# Patient Record
Sex: MALE | Race: BLACK OR AFRICAN AMERICAN | NOT HISPANIC OR LATINO | URBAN - METROPOLITAN AREA
[De-identification: names, ages, dates, MRNs, and addresses within clinical notes are randomized per-mention and may not be internally consistent; named-entity substitution may affect disease eponyms.]

---

## 2021-07-01 ENCOUNTER — OUTPATIENT (OUTPATIENT)
Dept: OUTPATIENT SERVICES | Facility: HOSPITAL | Age: 66
LOS: 1 days | End: 2021-07-01
Payer: MEDICAID

## 2021-07-12 VITALS
SYSTOLIC BLOOD PRESSURE: 158 MMHG | HEART RATE: 94 BPM | HEIGHT: 67 IN | DIASTOLIC BLOOD PRESSURE: 95 MMHG | WEIGHT: 126.99 LBS

## 2021-07-12 NOTE — H&P ADULT - NSICDXPASTMEDICALHX_GEN_ALL_CORE_FT
PAST MEDICAL HISTORY:  HLD (hyperlipidemia)     HTN (hypertension)     PVD (peripheral vascular disease)     Systolic CHF

## 2021-07-12 NOTE — H&P ADULT - NSHPLABSRESULTS_GEN_ALL_CORE
12.7   7.62  )-----------( 352      ( 14 Jul 2021 15:00 )             41.1       07-14    138  |  100  |  11  ----------------------------<  90  3.7   |  31  |  0.85    Ca    9.5      14 Jul 2021 15:00    TPro  7.1  /  Alb  4.3  /  TBili  0.3  /  DBili  0.2  /  AST  27  /  ALT  19  /  AlkPhos  90  07-14      PT/INR - ( 14 Jul 2021 15:00 )   PT: 12.6 sec;   INR: 1.05          PTT - ( 14 Jul 2021 15:00 )  PTT:35.2 sec              EKG: NSR; TWI in II/III/aVF.

## 2021-07-12 NOTE — H&P ADULT - HISTORY OF PRESENT ILLNESS
Cardiologist: Dr. Marisela Drake   COVID:   Escort:   Pharmacy: [facility] [address] [facility] [phone]    of note: Pharm on chart has only 3 prescription (lipitor 10, Coreg 6.25 BID and enalapril 10 ; confirm meds on arrival.     66 y/o M current smoker with PMH of  HTN, HLD,  sCHF ( EF 25 %), CVA, severe PVD b/l (plan for PAD revascularization after cardiac cath) who presents to his cardiologist Dr. Drake for routine follow up. Pt. c/o chest discomfort, FRIEDMAN and leg claudication; Pt. also with recent admission at [facility] for acute sCHF exacerbation. Pt. has non-radiating subternal area describes it as pressure like which has been gradually worsening occurring intermittently for the last few weeks which occurs on moderate physical exertion and relived with rest. Pt. also has FRIEDMAN on light physical exertion. Also has b/l severe calf/thigh pain associated with LE edema, pain/heaviness occurring on exertion which is relieved with rest. Pt. denies any palpitations, N/V, syncope, fever, chills.   NST 6/12/21 revealed small amount of ischemia in apical region, EF 16 %.  ECHO 6/12/21 revealed EF 25 %, mild LVH, global LV hypo kinesis, grade 2 LV diastolic dysfunction, moderate MR/TR, mild pulmonary HTN. LE US revealed Right: severe stenosis of SFA, moderate stenosis of PTA and ELICEO occluded; Left: SFA occluded, severe stenosis of popliteal, PTA, ELICEO given the waveform concern for iliac stenosis. In light of patient’s risk factors, CCS class 3 sx and abnormal NST, pt. is now referred for cardiac cath with possible intervention.   Cardiologist: Dr. Marisela Drake   COVID negative 7/12/21 (in ppw from [facility])  Escort:   Pharmacy: [facility] [address] [facility] [phone]    of note: Pharm on chart has only 3 prescription (lipitor 10, Coreg 6.25 BID and enalapril 10 ; confirm meds on arrival.     64 y/o M current smoker with PMH of  HTN, HLD,  sCHF ( EF 25 %), CVA, severe PVD b/l (plan for PAD revascularization after cardiac cath) who presents to his cardiologist Dr. Drake for routine follow up. Pt. c/o chest discomfort, FRIEDMAN and leg claudication; Pt. also with recent admission at [facility] for acute sCHF exacerbation. Pt. has non-radiating subternal area describes it as pressure like which has been gradually worsening occurring intermittently for the last few weeks which occurs on moderate physical exertion and relived with rest. Pt. also has FRIEDMAN on light physical exertion. Also has b/l severe calf/thigh pain associated with LE edema, pain/heaviness occurring on exertion which is relieved with rest. Pt. denies any palpitations, N/V, syncope, fever, chills.   NST 6/12/21 revealed small amount of ischemia in apical region, EF 16 %.  ECHO 6/12/21 revealed EF 25 %, mild LVH, global LV hypo kinesis, grade 2 LV diastolic dysfunction, moderate MR/TR, mild pulmonary HTN. LE US revealed Right: severe stenosis of SFA, moderate stenosis of PTA and ELICEO occluded; Left: SFA occluded, severe stenosis of popliteal, PTA, ELICEO given the waveform concern for iliac stenosis. In light of patient’s risk factors, CCS class 3 sx and abnormal NST, pt. is now referred for cardiac cath with possible intervention.   Cardiologist: Dr. Marisela Drake   COVID negative 7/12/21 (in ppw from [facility])  Escort:   Pharmacy: [facility] [address] [facility] [phone]    66 y/o M current smoker with PMH of  HTN, HLD,  sCHF ( EF 25 %), CVA, severe PVD b/l (plan for PAD revascularization after cardiac cath) who presents to his cardiologist Dr. Drake for routine follow up. Pt. c/o chest discomfort, FRIEDMAN and leg claudication; Pt. also with recent admission at [facility] for acute sCHF exacerbation. Pt. has non-radiating subternal area describes it as pressure like which has been gradually worsening occurring intermittently for the last few weeks which occurs on moderate physical exertion and relived with rest. Pt. also has FRIEDMAN on light physical exertion. Also has b/l severe calf/thigh pain associated with LE edema, pain/heaviness occurring on exertion which is relieved with rest. Pt. denies any palpitations, N/V, syncope, fever, chills.   NST 6/12/21 revealed small amount of ischemia in apical region, EF 16 %.  ECHO 6/12/21 revealed EF 25 %, mild LVH, global LV hypo kinesis, grade 2 LV diastolic dysfunction, moderate MR/TR, mild pulmonary HTN. LE US revealed Right: severe stenosis of SFA, moderate stenosis of PTA and ELICEO occluded; Left: SFA occluded, severe stenosis of popliteal, PTA, ELICEO given the waveform concern for iliac stenosis. In light of patient’s risk factors, CCS class 3 sx and abnormal NST, pt. is now referred for cardiac cath with possible intervention.

## 2021-07-12 NOTE — H&P ADULT - ASSESSMENT
64 y/o M current smoker with PMH of  HTN, HLD,  sCHF ( EF 25 %), CVA, severe PVD b/l (plan for PAD revascularization after cardiac cath) who presents to his cardiologist Dr. Drake for routine follow up. Pt. c/o chest discomfort, FRIEDMAN and leg claudication; Pt. also with recent admission at [facility] for acute sCHF exacerbation. Pt. has non-radiating subternal area describes it as pressure like which has been gradually worsening occurring intermittently for the last few weeks which occurs on moderate physical exertion and relived with rest. Pt. also has FRIEDMAN on light physical exertion. Also has b/l severe calf/thigh pain associated with LE edema, pain/heaviness occurring on exertion which is relieved with rest. Pt. denies any palpitations, N/V, syncope, fever, chills.   NST 6/12/21 revealed small amount of ischemia in apical region, EF 16 %.  ECHO 6/12/21 revealed EF 25 %, mild LVH, global LV hypo kinesis, grade 2 LV diastolic dysfunction, moderate MR/TR, mild pulmonary HTN. LE US revealed Right: severe stenosis of SFA, moderate stenosis of PTA and ELICEO occluded; Left: SFA occluded, severe stenosis of popliteal, PTA, ELICEO given the waveform concern for iliac stenosis. In light of patient’s risk factors, CCS class 3 sx and abnormal NST, pt. is now referred for cardiac cath with possible intervention.    --ASA II; Mallampati III.   --VSS.   --Pt is a candidate for moderate sedation.   --LOAD: pt reports missed doses of ASA 81mg PO QD; Pt loaded w/ ASA 325mg PO x1 and Plavix 600mg PO x1.   --FLUIDS: EF 25%; euvolemic on exam; NS 30cc/hr for KVO pre cath.   --Potassium 3.7 pre-procedure; ordered KCl 40mEq PO x1.     Risks & benefits of procedure and alternative therapy have been explained to the patient including but not limited to: allergic reaction, bleeding w/possible need for blood transfusion, infection, renal and vascular compromise, limb damage, arrhythmia, stroke, vessel dissection/perforation, Myocardial infarction, emergent CABG. Informed consent obtained and in chart.

## 2021-07-14 ENCOUNTER — INPATIENT (INPATIENT)
Facility: HOSPITAL | Age: 66
LOS: 0 days | Discharge: HOME | DRG: 247 | End: 2021-07-15
Attending: HOSPITALIST | Admitting: HOSPITALIST
Payer: MEDICAID

## 2021-07-14 LAB
A1C WITH ESTIMATED AVERAGE GLUCOSE RESULT: 5.8 % — HIGH (ref 4–5.6)
ALBUMIN SERPL ELPH-MCNC: 4.3 G/DL — SIGNIFICANT CHANGE UP (ref 3.3–5)
ALP SERPL-CCNC: 90 U/L — SIGNIFICANT CHANGE UP (ref 40–120)
ALT FLD-CCNC: 19 U/L — SIGNIFICANT CHANGE UP (ref 10–45)
ANION GAP SERPL CALC-SCNC: 7 MMOL/L — SIGNIFICANT CHANGE UP (ref 5–17)
APTT BLD: 35.2 SEC — SIGNIFICANT CHANGE UP (ref 27.5–35.5)
AST SERPL-CCNC: 27 U/L — SIGNIFICANT CHANGE UP (ref 10–40)
BASOPHILS # BLD AUTO: 0.03 K/UL — SIGNIFICANT CHANGE UP (ref 0–0.2)
BASOPHILS NFR BLD AUTO: 0.4 % — SIGNIFICANT CHANGE UP (ref 0–2)
BILIRUB DIRECT SERPL-MCNC: 0.2 MG/DL — SIGNIFICANT CHANGE UP (ref 0–0.2)
BILIRUB INDIRECT FLD-MCNC: 0.1 MG/DL — LOW (ref 0.2–1)
BILIRUB SERPL-MCNC: 0.3 MG/DL — SIGNIFICANT CHANGE UP (ref 0.2–1.2)
BUN SERPL-MCNC: 11 MG/DL — SIGNIFICANT CHANGE UP (ref 7–23)
CALCIUM SERPL-MCNC: 9.5 MG/DL — SIGNIFICANT CHANGE UP (ref 8.4–10.5)
CHLORIDE SERPL-SCNC: 100 MMOL/L — SIGNIFICANT CHANGE UP (ref 96–108)
CHOLEST SERPL-MCNC: 110 MG/DL — SIGNIFICANT CHANGE UP
CO2 SERPL-SCNC: 31 MMOL/L — SIGNIFICANT CHANGE UP (ref 22–31)
CREAT SERPL-MCNC: 0.85 MG/DL — SIGNIFICANT CHANGE UP (ref 0.5–1.3)
EOSINOPHIL # BLD AUTO: 0.1 K/UL — SIGNIFICANT CHANGE UP (ref 0–0.5)
EOSINOPHIL NFR BLD AUTO: 1.3 % — SIGNIFICANT CHANGE UP (ref 0–6)
ESTIMATED AVERAGE GLUCOSE: 120 MG/DL — HIGH (ref 68–114)
GLUCOSE SERPL-MCNC: 90 MG/DL — SIGNIFICANT CHANGE UP (ref 70–99)
HCT VFR BLD CALC: 41.1 % — SIGNIFICANT CHANGE UP (ref 39–50)
HDLC SERPL-MCNC: 56 MG/DL — SIGNIFICANT CHANGE UP
HGB BLD-MCNC: 12.7 G/DL — LOW (ref 13–17)
IMM GRANULOCYTES NFR BLD AUTO: 0.3 % — SIGNIFICANT CHANGE UP (ref 0–1.5)
INR BLD: 1.05 — SIGNIFICANT CHANGE UP (ref 0.88–1.16)
LIPID PNL WITH DIRECT LDL SERPL: 42 MG/DL — SIGNIFICANT CHANGE UP
LYMPHOCYTES # BLD AUTO: 3.37 K/UL — HIGH (ref 1–3.3)
LYMPHOCYTES # BLD AUTO: 44.2 % — HIGH (ref 13–44)
MCHC RBC-ENTMCNC: 23.4 PG — LOW (ref 27–34)
MCHC RBC-ENTMCNC: 30.9 GM/DL — LOW (ref 32–36)
MCV RBC AUTO: 75.8 FL — LOW (ref 80–100)
MONOCYTES # BLD AUTO: 0.5 K/UL — SIGNIFICANT CHANGE UP (ref 0–0.9)
MONOCYTES NFR BLD AUTO: 6.6 % — SIGNIFICANT CHANGE UP (ref 2–14)
NEUTROPHILS # BLD AUTO: 3.6 K/UL — SIGNIFICANT CHANGE UP (ref 1.8–7.4)
NEUTROPHILS NFR BLD AUTO: 47.2 % — SIGNIFICANT CHANGE UP (ref 43–77)
NON HDL CHOLESTEROL: 54 MG/DL — SIGNIFICANT CHANGE UP
NRBC # BLD: 0 /100 WBCS — SIGNIFICANT CHANGE UP (ref 0–0)
PLATELET # BLD AUTO: 352 K/UL — SIGNIFICANT CHANGE UP (ref 150–400)
POTASSIUM SERPL-MCNC: 3.7 MMOL/L — SIGNIFICANT CHANGE UP (ref 3.5–5.3)
POTASSIUM SERPL-SCNC: 3.7 MMOL/L — SIGNIFICANT CHANGE UP (ref 3.5–5.3)
PROT SERPL-MCNC: 7.1 G/DL — SIGNIFICANT CHANGE UP (ref 6–8.3)
PROTHROM AB SERPL-ACNC: 12.6 SEC — SIGNIFICANT CHANGE UP (ref 10.6–13.6)
RBC # BLD: 5.42 M/UL — SIGNIFICANT CHANGE UP (ref 4.2–5.8)
RBC # FLD: 15.2 % — HIGH (ref 10.3–14.5)
SODIUM SERPL-SCNC: 138 MMOL/L — SIGNIFICANT CHANGE UP (ref 135–145)
TRIGL SERPL-MCNC: 60 MG/DL — SIGNIFICANT CHANGE UP
WBC # BLD: 7.62 K/UL — SIGNIFICANT CHANGE UP (ref 3.8–10.5)
WBC # FLD AUTO: 7.62 K/UL — SIGNIFICANT CHANGE UP (ref 3.8–10.5)

## 2021-07-14 PROCEDURE — 93010 ELECTROCARDIOGRAM REPORT: CPT

## 2021-07-14 PROCEDURE — 99223 1ST HOSP IP/OBS HIGH 75: CPT

## 2021-07-14 RX ORDER — FUROSEMIDE 40 MG
1 TABLET ORAL
Qty: 0 | Refills: 0 | DISCHARGE

## 2021-07-14 RX ORDER — CHLORHEXIDINE GLUCONATE 213 G/1000ML
1 SOLUTION TOPICAL ONCE
Refills: 0 | Status: DISCONTINUED | OUTPATIENT
Start: 2021-07-14 | End: 2021-07-14

## 2021-07-14 RX ORDER — CLOPIDOGREL BISULFATE 75 MG/1
600 TABLET, FILM COATED ORAL ONCE
Refills: 0 | Status: COMPLETED | OUTPATIENT
Start: 2021-07-14 | End: 2021-07-14

## 2021-07-14 RX ORDER — CARVEDILOL PHOSPHATE 80 MG/1
1 CAPSULE, EXTENDED RELEASE ORAL
Qty: 0 | Refills: 0 | DISCHARGE

## 2021-07-14 RX ORDER — CARVEDILOL PHOSPHATE 80 MG/1
6.25 CAPSULE, EXTENDED RELEASE ORAL EVERY 12 HOURS
Refills: 0 | Status: DISCONTINUED | OUTPATIENT
Start: 2021-07-14 | End: 2021-07-15

## 2021-07-14 RX ORDER — ASPIRIN/CALCIUM CARB/MAGNESIUM 324 MG
325 TABLET ORAL ONCE
Refills: 0 | Status: COMPLETED | OUTPATIENT
Start: 2021-07-14 | End: 2021-07-14

## 2021-07-14 RX ORDER — IPRATROPIUM/ALBUTEROL SULFATE 18-103MCG
1 AEROSOL WITH ADAPTER (GRAM) INHALATION
Qty: 0 | Refills: 0 | DISCHARGE

## 2021-07-14 RX ORDER — POTASSIUM CHLORIDE 20 MEQ
40 PACKET (EA) ORAL ONCE
Refills: 0 | Status: DISCONTINUED | OUTPATIENT
Start: 2021-07-14 | End: 2021-07-14

## 2021-07-14 RX ORDER — SODIUM CHLORIDE 9 MG/ML
500 INJECTION INTRAMUSCULAR; INTRAVENOUS; SUBCUTANEOUS
Refills: 0 | Status: DISCONTINUED | OUTPATIENT
Start: 2021-07-14 | End: 2021-07-14

## 2021-07-14 RX ORDER — ALBUTEROL 90 UG/1
2 AEROSOL, METERED ORAL EVERY 6 HOURS
Refills: 0 | Status: DISCONTINUED | OUTPATIENT
Start: 2021-07-14 | End: 2021-07-15

## 2021-07-14 RX ORDER — ASPIRIN/CALCIUM CARB/MAGNESIUM 324 MG
81 TABLET ORAL DAILY
Refills: 0 | Status: DISCONTINUED | OUTPATIENT
Start: 2021-07-15 | End: 2021-07-15

## 2021-07-14 RX ORDER — ATORVASTATIN CALCIUM 80 MG/1
1 TABLET, FILM COATED ORAL
Qty: 0 | Refills: 0 | DISCHARGE

## 2021-07-14 RX ORDER — POTASSIUM CHLORIDE 20 MEQ
40 PACKET (EA) ORAL ONCE
Refills: 0 | Status: COMPLETED | OUTPATIENT
Start: 2021-07-14 | End: 2021-07-14

## 2021-07-14 RX ORDER — CLOPIDOGREL BISULFATE 75 MG/1
75 TABLET, FILM COATED ORAL DAILY
Refills: 0 | Status: DISCONTINUED | OUTPATIENT
Start: 2021-07-15 | End: 2021-07-15

## 2021-07-14 RX ORDER — ATORVASTATIN CALCIUM 80 MG/1
40 TABLET, FILM COATED ORAL AT BEDTIME
Refills: 0 | Status: DISCONTINUED | OUTPATIENT
Start: 2021-07-14 | End: 2021-07-15

## 2021-07-14 RX ORDER — FUROSEMIDE 40 MG
40 TABLET ORAL DAILY
Refills: 0 | Status: DISCONTINUED | OUTPATIENT
Start: 2021-07-15 | End: 2021-07-15

## 2021-07-14 RX ADMIN — ATORVASTATIN CALCIUM 40 MILLIGRAM(S): 80 TABLET, FILM COATED ORAL at 23:55

## 2021-07-14 RX ADMIN — CARVEDILOL PHOSPHATE 6.25 MILLIGRAM(S): 80 CAPSULE, EXTENDED RELEASE ORAL at 23:55

## 2021-07-14 RX ADMIN — SODIUM CHLORIDE 30 MILLILITER(S): 9 INJECTION INTRAMUSCULAR; INTRAVENOUS; SUBCUTANEOUS at 15:43

## 2021-07-14 RX ADMIN — Medication 40 MILLIEQUIVALENT(S): at 22:12

## 2021-07-14 RX ADMIN — Medication 325 MILLIGRAM(S): at 15:44

## 2021-07-14 RX ADMIN — CLOPIDOGREL BISULFATE 600 MILLIGRAM(S): 75 TABLET, FILM COATED ORAL at 15:45

## 2021-07-14 NOTE — PATIENT PROFILE ADULT - TOBACCO CESSATION EDUCATION/COUNSELLING(PROVIDED IF TOBACCO USED IN THE PAST 30 DAYS) NON CORE MEASURE SITES
[Time Spent: ___ minutes] : I have spent [unfilled] minutes of time on the encounter.
Offered and patient declined

## 2021-07-15 VITALS — TEMPERATURE: 98 F

## 2021-07-15 LAB
ANION GAP SERPL CALC-SCNC: 9 MMOL/L — SIGNIFICANT CHANGE UP (ref 5–17)
BUN SERPL-MCNC: 12 MG/DL — SIGNIFICANT CHANGE UP (ref 7–23)
CALCIUM SERPL-MCNC: 9 MG/DL — SIGNIFICANT CHANGE UP (ref 8.4–10.5)
CHLORIDE SERPL-SCNC: 102 MMOL/L — SIGNIFICANT CHANGE UP (ref 96–108)
CO2 SERPL-SCNC: 27 MMOL/L — SIGNIFICANT CHANGE UP (ref 22–31)
COVID-19 SPIKE DOMAIN AB INTERP: POSITIVE
COVID-19 SPIKE DOMAIN ANTIBODY RESULT: 129 U/ML — HIGH
CREAT SERPL-MCNC: 0.84 MG/DL — SIGNIFICANT CHANGE UP (ref 0.5–1.3)
GLUCOSE SERPL-MCNC: 81 MG/DL — SIGNIFICANT CHANGE UP (ref 70–99)
HCT VFR BLD CALC: 38.9 % — LOW (ref 39–50)
HCV AB S/CO SERPL IA: 0.03 S/CO — SIGNIFICANT CHANGE UP
HCV AB SERPL-IMP: SIGNIFICANT CHANGE UP
HGB BLD-MCNC: 12.1 G/DL — LOW (ref 13–17)
MAGNESIUM SERPL-MCNC: 1.6 MG/DL — SIGNIFICANT CHANGE UP (ref 1.6–2.6)
MCHC RBC-ENTMCNC: 22.8 PG — LOW (ref 27–34)
MCHC RBC-ENTMCNC: 31.1 GM/DL — LOW (ref 32–36)
MCV RBC AUTO: 73.4 FL — LOW (ref 80–100)
NRBC # BLD: 0 /100 WBCS — SIGNIFICANT CHANGE UP (ref 0–0)
PLATELET # BLD AUTO: 321 K/UL — SIGNIFICANT CHANGE UP (ref 150–400)
POTASSIUM SERPL-MCNC: 4 MMOL/L — SIGNIFICANT CHANGE UP (ref 3.5–5.3)
POTASSIUM SERPL-SCNC: 4 MMOL/L — SIGNIFICANT CHANGE UP (ref 3.5–5.3)
RBC # BLD: 5.3 M/UL — SIGNIFICANT CHANGE UP (ref 4.2–5.8)
RBC # FLD: 15.1 % — HIGH (ref 10.3–14.5)
SARS-COV-2 IGG+IGM SERPL QL IA: 129 U/ML — HIGH
SARS-COV-2 IGG+IGM SERPL QL IA: POSITIVE
SODIUM SERPL-SCNC: 138 MMOL/L — SIGNIFICANT CHANGE UP (ref 135–145)
WBC # BLD: 8.09 K/UL — SIGNIFICANT CHANGE UP (ref 3.8–10.5)
WBC # FLD AUTO: 8.09 K/UL — SIGNIFICANT CHANGE UP (ref 3.8–10.5)

## 2021-07-15 PROCEDURE — 36415 COLL VENOUS BLD VENIPUNCTURE: CPT

## 2021-07-15 PROCEDURE — C1894: CPT

## 2021-07-15 PROCEDURE — C1725: CPT

## 2021-07-15 PROCEDURE — 80053 COMPREHEN METABOLIC PANEL: CPT

## 2021-07-15 PROCEDURE — 86769 SARS-COV-2 COVID-19 ANTIBODY: CPT

## 2021-07-15 PROCEDURE — 85730 THROMBOPLASTIN TIME PARTIAL: CPT

## 2021-07-15 PROCEDURE — C1887: CPT

## 2021-07-15 PROCEDURE — 80061 LIPID PANEL: CPT

## 2021-07-15 PROCEDURE — 99239 HOSP IP/OBS DSCHRG MGMT >30: CPT

## 2021-07-15 PROCEDURE — 83735 ASSAY OF MAGNESIUM: CPT

## 2021-07-15 PROCEDURE — 82248 BILIRUBIN DIRECT: CPT

## 2021-07-15 PROCEDURE — 85610 PROTHROMBIN TIME: CPT

## 2021-07-15 PROCEDURE — 83036 HEMOGLOBIN GLYCOSYLATED A1C: CPT

## 2021-07-15 PROCEDURE — 85027 COMPLETE CBC AUTOMATED: CPT

## 2021-07-15 PROCEDURE — 85025 COMPLETE CBC W/AUTO DIFF WBC: CPT

## 2021-07-15 PROCEDURE — 93005 ELECTROCARDIOGRAM TRACING: CPT

## 2021-07-15 PROCEDURE — 86803 HEPATITIS C AB TEST: CPT

## 2021-07-15 PROCEDURE — C1753: CPT

## 2021-07-15 PROCEDURE — 80048 BASIC METABOLIC PNL TOTAL CA: CPT

## 2021-07-15 PROCEDURE — C1874: CPT

## 2021-07-15 PROCEDURE — 93010 ELECTROCARDIOGRAM REPORT: CPT

## 2021-07-15 PROCEDURE — C1769: CPT

## 2021-07-15 RX ORDER — ASPIRIN/CALCIUM CARB/MAGNESIUM 324 MG
1 TABLET ORAL
Qty: 30 | Refills: 11
Start: 2021-07-15 | End: 2022-07-09

## 2021-07-15 RX ORDER — MAGNESIUM SULFATE 500 MG/ML
2 VIAL (ML) INJECTION ONCE
Refills: 0 | Status: COMPLETED | OUTPATIENT
Start: 2021-07-15 | End: 2021-07-15

## 2021-07-15 RX ORDER — ASPIRIN/CALCIUM CARB/MAGNESIUM 324 MG
0 TABLET ORAL
Qty: 0 | Refills: 0 | DISCHARGE

## 2021-07-15 RX ORDER — PANTOPRAZOLE SODIUM 20 MG/1
40 TABLET, DELAYED RELEASE ORAL
Refills: 0 | Status: DISCONTINUED | OUTPATIENT
Start: 2021-07-15 | End: 2021-07-15

## 2021-07-15 RX ORDER — CLOPIDOGREL BISULFATE 75 MG/1
1 TABLET, FILM COATED ORAL
Qty: 30 | Refills: 11
Start: 2021-07-15 | End: 2022-07-09

## 2021-07-15 RX ADMIN — Medication 10 MILLIGRAM(S): at 11:46

## 2021-07-15 RX ADMIN — Medication 50 GRAM(S): at 11:46

## 2021-07-15 RX ADMIN — Medication 81 MILLIGRAM(S): at 11:46

## 2021-07-15 RX ADMIN — PANTOPRAZOLE SODIUM 40 MILLIGRAM(S): 20 TABLET, DELAYED RELEASE ORAL at 05:41

## 2021-07-15 RX ADMIN — CLOPIDOGREL BISULFATE 75 MILLIGRAM(S): 75 TABLET, FILM COATED ORAL at 11:46

## 2021-07-15 RX ADMIN — CARVEDILOL PHOSPHATE 6.25 MILLIGRAM(S): 80 CAPSULE, EXTENDED RELEASE ORAL at 05:40

## 2021-07-15 NOTE — DISCHARGE NOTE PROVIDER - HOSPITAL COURSE
64 y/o M current smoker with PMH of  HTN, HLD,  sCHF ( EF 25 %), CVA, severe PVD b/l (plan for PAD revascularization after cardiac cath) who presents to his cardiologist Dr. Drake for routine follow up. Pt. c/o chest discomfort, FRIEDMAN and leg claudication; Pt. also with recent admission at [facility] for acute sCHF exacerbation. Pt. has non-radiating subternal area describes it as pressure like which has been gradually worsening occurring intermittently for the last few weeks which occurs on moderate physical exertion and relived with rest. Pt. also has FRIEDMAN on light physical exertion. Also has b/l severe calf/thigh pain associated with LE edema, pain/heaviness occurring on exertion which is relieved with rest. Pt. denies any palpitations, N/V, syncope, fever, chills.   NST 6/12/21 revealed small amount of ischemia in apical region, EF 16 %.  ECHO 6/12/21 revealed EF 25 %, mild LVH, global LV hypo kinesis, grade 2 LV diastolic dysfunction, moderate MR/TR, mild pulmonary HTN. LE US revealed Right: severe stenosis of SFA, moderate stenosis of PTA and ELICEO occluded; Left: SFA occluded, severe stenosis of popliteal, PTA, ELICEO given the waveform concern for iliac stenosis. In light of patient’s risk factors, CCS class 3 sx and abnormal NST, pt. is now referred for cardiac cath with possible intervention.     S/p cardiac cath 7/14: MELISSA mLAD w/ kissing balloon to D2, LM normal, LCx mild luminal, RCA mild luminal, EF 25 % per echo, EDP 11, R TR @ 11:30     Patient was seen and examined at the bedside. No complaints at this time. AM labs WNL. R radial access site stable with distal pulses to baseline. As per Dr. Samuel, patient is stable for discharge home on Coreg 6.25mg BID, Lasix 40mg QD, Lipitor 40mg QD, Enalapril 10mg QD, ASA 81mg QD, Plavix 75mg QD with instruction to follow up with Dr. Marisela Drake in 1-2 weeks. Rx for cardiac rehab given to patient on d/c.\           Cardiac Rehab (STEMI/NSTEMI/ACS/Unstable Angina/CHF/Post PCI):            *Education on benefits of Cardiac Rehab provided to patient: Yes/No         *Referral and Prescription Given for Cardiac Rehab : Yes/No.  If No, Why Not?         *Pt given list of locations & instructed to contact their insurance company to review list of participating providers    Statin Prescribed (STEMI/NSTEMI/UA &/OR PCI this admission):  Yes/No; If No, Why Not?  DAPT: Prescriptions for Aspirin/Plavix/Brilinta/Effient e-prescribed to patient's pharmacy Yes/No__.                            66 y/o M current smoker with PMH of  HTN, HLD,  sCHF ( EF 25 %), CVA, severe PVD b/l (plan for PAD revascularization after cardiac cath) who presents to his cardiologist Dr. Drake for routine follow up. Pt. c/o chest discomfort, FRIEDMAN and leg claudication; Pt. also with recent admission at [facility] for acute sCHF exacerbation. Pt. has non-radiating subternal area describes it as pressure like which has been gradually worsening occurring intermittently for the last few weeks which occurs on moderate physical exertion and relived with rest. Pt. also has FRIEDMAN on light physical exertion. Also has b/l severe calf/thigh pain associated with LE edema, pain/heaviness occurring on exertion which is relieved with rest. Pt. denies any palpitations, N/V, syncope, fever, chills.   NST 6/12/21 revealed small amount of ischemia in apical region, EF 16 %.  ECHO 6/12/21 revealed EF 25 %, mild LVH, global LV hypo kinesis, grade 2 LV diastolic dysfunction, moderate MR/TR, mild pulmonary HTN. LE US revealed Right: severe stenosis of SFA, moderate stenosis of PTA and ELICEO occluded; Left: SFA occluded, severe stenosis of popliteal, PTA, ELICEO given the waveform concern for iliac stenosis. In light of patient’s risk factors, CCS class 3 sx and abnormal NST, pt. is now referred for cardiac cath with possible intervention.     S/p cardiac cath 7/14: MELISSA mLAD w/ kissing balloon to D2, LM normal, LCx mild luminal, RCA mild luminal, EF 25 % per echo, EDP 11, R TR @ 11:30     Patient was seen and examined at the bedside. No complaints at this time. AM labs WNL. R radial access site with a small hematoma which was compressed and now stable with distal pulses to baseline. As per Dr. Samuel, patient is stable for discharge home on Coreg 6.25mg BID, Lasix 40mg QD, Lipitor 40mg QD, Enalapril 10mg QD, ASA 81mg QD, Plavix 75mg QD with instruction to follow up with Dr. Marisela Drake in 1-2 weeks. Rx for cardiac rehab given to patient on d/c.           Cardiac Rehab (STEMI/NSTEMI/ACS/Unstable Angina/CHF/Post PCI):            *Education on benefits of Cardiac Rehab provided to patient: Yes         *Referral and Prescription Given for Cardiac Rehab : Yes         *Pt given list of locations & instructed to contact their insurance company to review list of participating providers    Statin Prescribed (STEMI/NSTEMI/UA &/OR PCI this admission):  Yes  DAPT: Prescriptions for Aspirin/Plavix/Brilinta/Effient e-prescribed to patient's pharmacy Yes

## 2021-07-15 NOTE — DISCHARGE NOTE PROVIDER - NSDCMRMEDTOKEN_GEN_ALL_CORE_FT
aspirin 81 mg oral tablet: orally once a day  Combivent Respimat 20 mcg-100 mcg/inh inhalation aerosol: 1 puff(s) inhaled 4 times a day  Coreg 6.25 mg oral tablet: 1 tab(s) orally 2 times a day  enalapril 10 mg oral tablet: 1 tab(s) orally once a day  Lasix 40 mg oral tablet: 1 tab(s) orally once a day  Lipitor 40 mg oral tablet: 1 tab(s) orally once a day   aspirin 81 mg oral tablet: orally once a day  Cardiac rehab 3 x/wk x 12 weeks for diagnosis Post PCI:   Combivent Respimat 20 mcg-100 mcg/inh inhalation aerosol: 1 puff(s) inhaled 4 times a day  Coreg 6.25 mg oral tablet: 1 tab(s) orally 2 times a day  enalapril 10 mg oral tablet: 1 tab(s) orally once a day  Lasix 40 mg oral tablet: 1 tab(s) orally once a day  Lipitor 40 mg oral tablet: 1 tab(s) orally once a day   Adult Aspirin Regimen 81 mg oral delayed release tablet: 1 tab(s) orally once a day   Cardiac rehab 3 x/wk x 12 weeks for diagnosis Post PCI:   clopidogrel 75 mg oral tablet: 1 tab(s) orally once a day  Combivent Respimat 20 mcg-100 mcg/inh inhalation aerosol: 1 puff(s) inhaled 4 times a day  Coreg 6.25 mg oral tablet: 1 tab(s) orally 2 times a day  enalapril 10 mg oral tablet: 1 tab(s) orally once a day  Lasix 40 mg oral tablet: 1 tab(s) orally once a day  Lipitor 40 mg oral tablet: 1 tab(s) orally once a day

## 2021-07-15 NOTE — DISCHARGE NOTE PROVIDER - NSDCCPCAREPLAN_GEN_ALL_CORE_FT
PRINCIPAL DISCHARGE DIAGNOSIS  Diagnosis: CAD (coronary artery disease)  Assessment and Plan of Treatment: You underwent a cardiac catheterization and the blockage/narrowing due to plaque build-up in your middle left anterior descending was opened using a stent. You should continue taking aspirin 81mg daily and Plavix 75 mg daily.  NEVER MISS A DOSE OF ASPIRIN OR PLAVIX; IF YOU DO, YOU ARE AT RISK OF YOUR STENT CLOSING AND HAVING A HEART ATTACK. DO NOT STOP THESE TWO MEDICATIONS UNLESS INSTRUCTED TO DO SO BY YOUR CARDIOLOGIST.  Aspirin and Plavix can put you at increased risk of bleeding; please avoid NSAIDS (such as Motrin, Advil, Ibuprofen, Naproxen, or Aleve, as these medications may further your risk of bleeding.  -Your procedure was done through your wrist  -You do not need to keep this area covered and you may shower  -Please avoid any heavy lifting  (no more than 3 to 5 lbs) or strenuous activity for five days  -If you develop any swelling, bleeding, hardening of the skin (hematoma formation), acute pain, numbness/tingling  in your arm or leg please contact your doctor immediately or call our 24/7 line: [phone]  -Please follow up with Dr. Drake in 1-2 weeks  Please return to the hospital or seek immediate medical attention if you have symptoms including, but not limited to, persistent chest pain or shortness of breath, especially if it’s associated with nausea, vomiting, sweating, passing out, or pain radiating to your jaw, shoulder, or arm.  We have provided you with a prescription for cardiac rehab which is medically supervised exercise program for your heart and has been shown to improve the quantity and quality of life of people with heart disease like yours. You should attend cardiac rehab 3 times per week for 12 weeks. We have provided you with a list of nearby facilities. Please call your insurance carrier to determine which of these facilities are covered under your plan. Please bring this prescription with you to your follow up appointment with your cardiologist who can then further assist you to enroll into a cardiac rehab program.      SECONDARY DISCHARGE DIAGNOSES  Diagnosis: HTN (hypertension)  Assessment and Plan of Treatment: Please continue your home Coreg 6.25mg twice daily, Lasix 40mg daily and Enalapril 10mg daily there have been no changes    Diagnosis: HLD (hyperlipidemia)  Assessment and Plan of Treatment: Please continue your home lipitor 40mg daily there have been no changes    Diagnosis: Chronic systolic congestive heart failure  Assessment and Plan of Treatment: Please continue your home lasix 40mg daily there have been no changes

## 2021-07-15 NOTE — DISCHARGE NOTE NURSING/CASE MANAGEMENT/SOCIAL WORK - PATIENT PORTAL LINK FT
You can access the FollowMyHealth Patient Portal offered by [facility] by registering at the following website: [email or web address]. By joining [facility]’s FollowMyHealth portal, you will also be able to view your health information using other applications (apps) compatible with our system.

## 2021-07-15 NOTE — DISCHARGE NOTE PROVIDER - CARE PROVIDER_API CALL
Marisela Drake)  Cardiovascular Medicine  [address], [address]  [place] [address]  Phone: [phone]  Fax: [phone]  Established Patient  Follow Up Time: 2 weeks

## 2021-07-15 NOTE — DISCHARGE NOTE PROVIDER - NSDCFUADDINST_GEN_ALL_CORE_FT
You underwent a coronary angiogram and should wait 3 days before returning to ordinary activities. Do not drive for 2 days. Consult your doctor before returning to vigorous activity. You may return to work in 3-5 days. The catheter from your wrist was removed. You may shower once the dressing is removed, but avoid baths, hot tubs, or swimming for 5 days to prevent infection. If you notice bleeding from the site, hardening and pain at the site, drainage or redness from the site, coolness/paleness of the wrist weakness/paralysis of right arm (unable to lift or move) swelling, or fever, please call [phone]. Please continue your Aspirin and Plavix as prescribed unless otherwise indicated by your cardiologist. Please follow up with Dr. Drake in 1-2 weeks. All of your needed prescriptions have been sent electronically to your pharmacy.

## 2021-07-19 DIAGNOSIS — Z71.89 OTHER SPECIFIED COUNSELING: ICD-10-CM

## 2021-07-20 DIAGNOSIS — R07.89 OTHER CHEST PAIN: ICD-10-CM

## 2021-07-20 DIAGNOSIS — I70.219 ATHEROSCLEROSIS OF NATIVE ARTERIES OF EXTREMITIES WITH INTERMITTENT CLAUDICATION, UNSPECIFIED EXTREMITY: ICD-10-CM

## 2021-07-20 DIAGNOSIS — I25.84 CORONARY ATHEROSCLEROSIS DUE TO CALCIFIED CORONARY LESION: ICD-10-CM

## 2021-07-20 DIAGNOSIS — Z86.73 PERSONAL HISTORY OF TRANSIENT ISCHEMIC ATTACK (TIA), AND CEREBRAL INFARCTION WITHOUT RESIDUAL DEFICITS: ICD-10-CM

## 2021-07-20 DIAGNOSIS — I25.10 ATHEROSCLEROTIC HEART DISEASE OF NATIVE CORONARY ARTERY WITHOUT ANGINA PECTORIS: ICD-10-CM

## 2021-07-20 DIAGNOSIS — I11.0 HYPERTENSIVE HEART DISEASE WITH HEART FAILURE: ICD-10-CM

## 2021-07-20 DIAGNOSIS — F17.210 NICOTINE DEPENDENCE, CIGARETTES, UNCOMPLICATED: ICD-10-CM

## 2021-07-20 DIAGNOSIS — E78.5 HYPERLIPIDEMIA, UNSPECIFIED: ICD-10-CM

## 2021-07-20 DIAGNOSIS — I50.22 CHRONIC SYSTOLIC (CONGESTIVE) HEART FAILURE: ICD-10-CM

## 2021-11-01 PROCEDURE — G9005: CPT

## 2024-05-12 NOTE — DISCHARGE NOTE NURSING/CASE MANAGEMENT/SOCIAL WORK - NSDCPEFALRISK_GEN_ALL_CORE
Okay to use any virtual / held slot in the next 1-2 weeks for in person visit.    Patient information on fall and injury prevention